# Patient Record
Sex: FEMALE | Race: WHITE | NOT HISPANIC OR LATINO | URBAN - METROPOLITAN AREA
[De-identification: names, ages, dates, MRNs, and addresses within clinical notes are randomized per-mention and may not be internally consistent; named-entity substitution may affect disease eponyms.]

---

## 2020-07-25 ENCOUNTER — TELEPHONE ENCOUNTER (OUTPATIENT)
Dept: URBAN - METROPOLITAN AREA CLINIC 13 | Facility: CLINIC | Age: 70
End: 2020-07-25

## 2020-07-25 RX ORDER — CALCIUM CITRATE/VITAMIN D3 315MG-6.25
TAKE 3 TABLET TWICE DAILY. PATIENT STATES UNKNOWN DOSAGE TABLET ORAL
Refills: 0 | OUTPATIENT
End: 2017-09-15

## 2020-07-25 RX ORDER — ESTRADIOL 10 UG/1
INSERT 1 SUPP WEEKLY INSERT VAGINAL
Refills: 0 | OUTPATIENT
Start: 2012-10-26 | End: 2017-09-15

## 2020-07-25 RX ORDER — MIRABEGRON 25 MG/1
TAKE 1 TABLET DAILY TABLET, FILM COATED, EXTENDED RELEASE ORAL
Refills: 0 | OUTPATIENT
End: 2018-09-21

## 2020-07-25 RX ORDER — ESTRADIOL/NORETHINDRONE ACETATE 1; .5 MG/1; MG/1
TAKE 1 TABLET EVERY OTHER DAY TABLET, FILM COATED ORAL
Refills: 0 | OUTPATIENT
End: 2017-09-15

## 2020-07-25 RX ORDER — SOLIFENACIN SUCCINATE 10 MG/1
TABLET, FILM COATED ORAL
Qty: 90 | Refills: 0 | OUTPATIENT
Start: 2012-05-25 | End: 2013-04-02

## 2020-07-26 ENCOUNTER — TELEPHONE ENCOUNTER (OUTPATIENT)
Dept: URBAN - METROPOLITAN AREA CLINIC 13 | Facility: CLINIC | Age: 70
End: 2020-07-26

## 2020-07-26 RX ORDER — GUAIFEN/P-PROPANOLAMIN/CODEINE
TAKE 1 TABLET BY MOUTH AT BEDTIME AS NEEDED EXPECTORANT ORAL
Qty: 30 | Refills: 0 | Status: ACTIVE | COMMUNITY
Start: 2012-07-05

## 2020-07-26 RX ORDER — KETOROLAC TROMETHAMINE 10 MG/1
TABLET, FILM COATED ORAL
Qty: 15 | Refills: 0 | Status: ACTIVE | COMMUNITY
Start: 2013-08-13

## 2020-07-26 RX ORDER — MIRABEGRON 50 MG/1
TABLET, FILM COATED, EXTENDED RELEASE ORAL
Qty: 90 | Refills: 0 | Status: ACTIVE | COMMUNITY
Start: 2013-03-07

## 2020-07-26 RX ORDER — MULTIVITAMIN
TAKE 1 TABLET DAILY TABLET ORAL
Refills: 0 | Status: ACTIVE | COMMUNITY

## 2020-07-26 RX ORDER — LIFITEGRAST 50 MG/ML
INSTILL 1 DROP IN EACH EYE TWICE DAILY APPROXIMATELY 12 HOURS APART SOLUTION/ DROPS OPHTHALMIC
Qty: 30 | Refills: 0 | Status: ACTIVE | COMMUNITY
Start: 2018-07-03

## 2020-07-26 RX ORDER — GUAIFEN/P-PROPANOLAMIN/CODEINE
EXPECTORANT ORAL
Qty: 30 | Refills: 0 | Status: ACTIVE | COMMUNITY
Start: 2012-09-10

## 2020-07-26 RX ORDER — DIPHENOXYLATE HYDROCHLORIDE AND ATROPINE SULFATE 2.5; .025 MG/1; MG/1
TABLET ORAL
Qty: 20 | Refills: 0 | Status: ACTIVE | COMMUNITY
Start: 2012-08-21

## 2020-07-26 RX ORDER — HYDROCORTISONE ACETATE AND PRAMOXINE HYDROCHLORIDE 10; 10 MG/G; MG/G
APPLY TO AFFECTED AREA TWICE A DAY CREAM TOPICAL
Qty: 28 | Refills: 0 | Status: ACTIVE | COMMUNITY
Start: 2012-08-31

## 2020-07-26 RX ORDER — RIFAXIMIN 200 MG/1
TAKE 1 TABLET BY MOUTH 3 TIMES A DAY FOR 10 DAYS TABLET ORAL
Qty: 30 | Refills: 0 | Status: ACTIVE | COMMUNITY
Start: 2012-09-12

## 2020-07-26 RX ORDER — IVERMECTIN 10 MG/G
APPLY TO FACE ONCE DAILY CREAM TOPICAL
Qty: 45 | Refills: 0 | Status: ACTIVE | COMMUNITY
Start: 2018-05-17

## 2020-07-26 RX ORDER — MIRABEGRON 50 MG/1
TABLET, FILM COATED, EXTENDED RELEASE ORAL
Qty: 90 | Refills: 0 | Status: ACTIVE | COMMUNITY
Start: 2013-05-09

## 2020-07-26 RX ORDER — AZITHROMYCIN DIHYDRATE 250 MG/1
TABLET, FILM COATED ORAL
Qty: 6 | Refills: 0 | Status: ACTIVE | COMMUNITY
Start: 2012-08-20

## 2020-07-26 RX ORDER — OXYBUTYNIN CHLORIDE 15 MG/1
TAKE 1 TABLET (15 MG) BY ORAL ROUTE ONCE DAILY FOR 30 DAYS TABLET, EXTENDED RELEASE ORAL
Qty: 30 | Refills: 0 | Status: ACTIVE | COMMUNITY
Start: 2018-04-02

## 2020-07-26 RX ORDER — ESTRADIOL AND NORETHINDRONE ACETATE 1; .5 MG/1; MG/1
TABLET, FILM COATED ORAL
Qty: 1 | Refills: 0 | Status: ACTIVE | COMMUNITY
Start: 2012-04-11

## 2020-07-26 RX ORDER — ALENDRONATE SODIUM 70 MG
TAKE 1 TABLET ONCE WEEKLY TABLET ORAL
Refills: 0 | Status: ACTIVE | COMMUNITY

## 2020-07-26 RX ORDER — PREDNISONE 10 MG/1
TABLET ORAL
Qty: 21 | Refills: 0 | Status: ACTIVE | COMMUNITY
Start: 2013-03-13

## 2020-07-26 RX ORDER — HYDROCORTISONE ACETATE AND PRAMOXINE HYDROCHLORIDE 10; 10 MG/G; MG/G
CREAM TOPICAL
Qty: 28 | Refills: 0 | Status: ACTIVE | COMMUNITY
Start: 2012-09-04

## 2020-07-26 RX ORDER — RIFAXIMIN 200 MG/1
TABLET ORAL
Qty: 30 | Refills: 0 | Status: ACTIVE | COMMUNITY
Start: 2012-09-13

## 2020-07-26 RX ORDER — OXYBUTYNIN CHLORIDE 5 MG/1
TAKE 1 TABLET EVERY OTHER DAY TABLET ORAL
Refills: 0 | Status: ACTIVE | COMMUNITY

## 2020-07-26 RX ORDER — OXYMETAZOLINE HYDROCHLORIDE 1 G/100G
APPLY TO FACE ONCE DAILY IN IN THE MORNING WHEN NEEDED FOR REDNESS FLA CREAM TOPICAL
Qty: 30 | Refills: 0 | Status: ACTIVE | COMMUNITY
Start: 2018-05-17

## 2020-07-26 RX ORDER — GUAIFEN/P-PROPANOLAMIN/CODEINE
EXPECTORANT ORAL
Qty: 30 | Refills: 0 | Status: ACTIVE | COMMUNITY
Start: 2012-04-12

## 2020-07-26 RX ORDER — ZOLPIDEM TARTRATE 6.25 MG/1
TABLET, EXTENDED RELEASE ORAL
Qty: 30 | Refills: 0 | Status: ACTIVE | COMMUNITY
Start: 2013-05-30

## 2020-07-26 RX ORDER — GUAIFEN/P-PROPANOLAMIN/CODEINE
EXPECTORANT ORAL
Qty: 30 | Refills: 0 | Status: ACTIVE | COMMUNITY
Start: 2012-12-05

## 2020-07-26 RX ORDER — OXYCODONE AND ACETAMINOPHEN 5; 325 MG/1; MG/1
TABLET ORAL
Qty: 40 | Refills: 0 | Status: ACTIVE | COMMUNITY
Start: 2013-08-13

## 2021-05-24 ENCOUNTER — TELEPHONE ENCOUNTER (OUTPATIENT)
Dept: URBAN - METROPOLITAN AREA CLINIC 113 | Facility: CLINIC | Age: 71
End: 2021-05-24

## 2021-05-24 ENCOUNTER — LAB OUTSIDE AN ENCOUNTER (OUTPATIENT)
Dept: URBAN - METROPOLITAN AREA CLINIC 113 | Facility: CLINIC | Age: 71
End: 2021-05-24

## 2021-05-25 ENCOUNTER — TELEPHONE ENCOUNTER (OUTPATIENT)
Dept: URBAN - METROPOLITAN AREA CLINIC 113 | Facility: CLINIC | Age: 71
End: 2021-05-25

## 2022-06-07 ENCOUNTER — TELEPHONE ENCOUNTER (OUTPATIENT)
Dept: URBAN - METROPOLITAN AREA CLINIC 113 | Facility: CLINIC | Age: 72
End: 2022-06-07

## 2022-06-08 ENCOUNTER — LAB OUTSIDE AN ENCOUNTER (OUTPATIENT)
Dept: URBAN - METROPOLITAN AREA CLINIC 113 | Facility: CLINIC | Age: 72
End: 2022-06-08

## 2023-04-24 ENCOUNTER — TELEPHONE ENCOUNTER (OUTPATIENT)
Dept: URBAN - METROPOLITAN AREA CLINIC 113 | Facility: CLINIC | Age: 73
End: 2023-04-24

## 2023-07-05 ENCOUNTER — OFFICE VISIT (OUTPATIENT)
Dept: URBAN - METROPOLITAN AREA SURGERY CENTER 25 | Facility: SURGERY CENTER | Age: 73
End: 2023-07-05

## 2023-07-28 ENCOUNTER — OUT OF OFFICE VISIT (OUTPATIENT)
Dept: URBAN - METROPOLITAN AREA SURGERY CENTER 25 | Facility: SURGERY CENTER | Age: 73
End: 2023-07-28
Payer: MEDICARE

## 2023-07-28 ENCOUNTER — WEB ENCOUNTER (OUTPATIENT)
Dept: URBAN - METROPOLITAN AREA SURGERY CENTER 25 | Facility: SURGERY CENTER | Age: 73
End: 2023-07-28

## 2023-07-28 DIAGNOSIS — K64.0 FIRST DEGREE HEMORRHOIDS: ICD-10-CM

## 2023-07-28 DIAGNOSIS — Z12.11 COLON CANCER SCREENING (HIGH RISK): ICD-10-CM

## 2023-07-28 DIAGNOSIS — K57.30 COLON, DIVERTICULOSIS: ICD-10-CM

## 2023-07-28 DIAGNOSIS — Z12.11 COLON CANCER SCREENING: ICD-10-CM

## 2023-07-28 PROCEDURE — G0121 COLON CA SCRN NOT HI RSK IND: HCPCS | Performed by: INTERNAL MEDICINE

## 2023-07-28 PROCEDURE — 00811 ANES LWR INTST NDSC NOS: CPT | Performed by: ANESTHESIOLOGY

## 2023-07-28 PROCEDURE — G8907 PT DOC NO EVENTS ON DISCHARG: HCPCS | Performed by: INTERNAL MEDICINE

## 2023-07-28 PROCEDURE — 00811 ANES LWR INTST NDSC NOS: CPT | Performed by: NURSE ANESTHETIST, CERTIFIED REGISTERED

## 2024-06-07 ENCOUNTER — TELEPHONE ENCOUNTER (OUTPATIENT)
Dept: URBAN - METROPOLITAN AREA CLINIC 113 | Facility: CLINIC | Age: 74
End: 2024-06-07

## 2024-09-26 ENCOUNTER — TELEPHONE ENCOUNTER (OUTPATIENT)
Dept: URBAN - METROPOLITAN AREA CLINIC 113 | Facility: CLINIC | Age: 74
End: 2024-09-26

## 2025-06-23 ENCOUNTER — TELEPHONE ENCOUNTER (OUTPATIENT)
Dept: URBAN - METROPOLITAN AREA CLINIC 23 | Facility: CLINIC | Age: 75
End: 2025-06-23

## 2025-06-24 ENCOUNTER — DASHBOARD ENCOUNTERS (OUTPATIENT)
Age: 75
End: 2025-06-24

## 2025-06-24 ENCOUNTER — OFFICE VISIT (OUTPATIENT)
Dept: URBAN - METROPOLITAN AREA CLINIC 113 | Facility: CLINIC | Age: 75
End: 2025-06-24
Payer: MEDICARE

## 2025-06-24 DIAGNOSIS — K86.2 PANCREATIC CYST: ICD-10-CM

## 2025-06-24 DIAGNOSIS — R14.3 EXCESSIVE GAS: ICD-10-CM

## 2025-06-24 DIAGNOSIS — R14.0 ABDOMINAL BLOATING: ICD-10-CM

## 2025-06-24 PROBLEM — 80301007: Status: ACTIVE | Noted: 2025-06-24

## 2025-06-24 PROBLEM — 116289008: Status: ACTIVE | Noted: 2025-06-24

## 2025-06-24 PROCEDURE — 99214 OFFICE O/P EST MOD 30 MIN: CPT | Performed by: NURSE PRACTITIONER

## 2025-06-24 RX ORDER — RIFAXIMIN 200 MG/1
TAKE 1 TABLET BY MOUTH 3 TIMES A DAY FOR 10 DAYS TABLET ORAL
Qty: 30 | Refills: 0 | Status: ON HOLD | COMMUNITY
Start: 2012-09-12

## 2025-06-24 RX ORDER — AZITHROMYCIN DIHYDRATE 250 MG/1
TABLET, FILM COATED ORAL
Qty: 6 | Refills: 0 | Status: ON HOLD | COMMUNITY
Start: 2012-08-20

## 2025-06-24 RX ORDER — GUAIFEN/P-PROPANOLAMIN/CODEINE
EXPECTORANT ORAL
Qty: 30 | Refills: 0 | Status: ON HOLD | COMMUNITY
Start: 2012-04-12

## 2025-06-24 RX ORDER — PREDNISONE 10 MG/1
TABLET ORAL
Qty: 21 | Refills: 0 | Status: ON HOLD | COMMUNITY
Start: 2013-03-13

## 2025-06-24 RX ORDER — METRONIDAZOLE 500 MG/1
1 TABLET TABLET ORAL THREE TIMES A DAY
Qty: 30 | OUTPATIENT
Start: 2025-06-24 | End: 2025-07-04

## 2025-06-24 RX ORDER — MULTIVITAMIN
TAKE 1 TABLET DAILY TABLET ORAL
Refills: 0 | Status: ON HOLD | COMMUNITY

## 2025-06-24 RX ORDER — MIRABEGRON 50 MG/1
TABLET, FILM COATED, EXTENDED RELEASE ORAL
Qty: 90 | Refills: 0 | Status: ON HOLD | COMMUNITY
Start: 2013-03-07

## 2025-06-24 RX ORDER — ESTRADIOL AND NORETHINDRONE ACETATE 1; .5 MG/1; MG/1
TABLET, FILM COATED ORAL
Qty: 1 | Refills: 0 | Status: ON HOLD | COMMUNITY
Start: 2012-04-11

## 2025-06-24 RX ORDER — IVERMECTIN 10 MG/G
1 APPLICATION CREAM TOPICAL ONCE A DAY
Status: ACTIVE | COMMUNITY
Start: 2025-06-24

## 2025-06-24 RX ORDER — OXYBUTYNIN CHLORIDE 5 MG/1
TAKE 1 TABLET EVERY OTHER DAY TABLET ORAL
Refills: 0 | Status: ON HOLD | COMMUNITY

## 2025-06-24 RX ORDER — OXYCODONE AND ACETAMINOPHEN 5; 325 MG/1; MG/1
TABLET ORAL
Qty: 40 | Refills: 0 | Status: ON HOLD | COMMUNITY
Start: 2013-08-13

## 2025-06-24 RX ORDER — IVERMECTIN 10 MG/G
APPLY TO FACE ONCE DAILY CREAM TOPICAL
Qty: 45 | Refills: 0 | Status: ON HOLD | COMMUNITY
Start: 2018-05-17

## 2025-06-24 RX ORDER — OXYMETAZOLINE HYDROCHLORIDE 1 G/100G
APPLY TO FACE ONCE DAILY IN IN THE MORNING WHEN NEEDED FOR REDNESS FLA CREAM TOPICAL
Qty: 30 | Refills: 0 | Status: ON HOLD | COMMUNITY
Start: 2018-05-17

## 2025-06-24 RX ORDER — OXYBUTYNIN CHLORIDE 15 MG/1
TAKE 1 TABLET (15 MG) BY ORAL ROUTE ONCE DAILY FOR 30 DAYS TABLET, EXTENDED RELEASE ORAL
Qty: 30 | Refills: 0 | Status: ON HOLD | COMMUNITY
Start: 2018-04-02

## 2025-06-24 RX ORDER — LIFITEGRAST 50 MG/ML
INSTILL 1 DROP IN EACH EYE TWICE DAILY APPROXIMATELY 12 HOURS APART SOLUTION/ DROPS OPHTHALMIC
Qty: 30 | Refills: 0 | Status: ON HOLD | COMMUNITY
Start: 2018-07-03

## 2025-06-24 RX ORDER — ZOLPIDEM TARTRATE 6.25 MG/1
TABLET, EXTENDED RELEASE ORAL
Qty: 30 | Refills: 0 | Status: ON HOLD | COMMUNITY
Start: 2013-05-30

## 2025-06-24 RX ORDER — DIPHENOXYLATE HYDROCHLORIDE AND ATROPINE SULFATE 2.5; .025 MG/1; MG/1
TABLET ORAL
Qty: 20 | Refills: 0 | Status: ON HOLD | COMMUNITY
Start: 2012-08-21

## 2025-06-24 RX ORDER — ALENDRONATE SODIUM 70 MG
TAKE 1 TABLET ONCE WEEKLY TABLET ORAL
Refills: 0 | Status: ON HOLD | COMMUNITY

## 2025-06-24 RX ORDER — HYDROCORTISONE ACETATE AND PRAMOXINE HYDROCHLORIDE 10; 10 MG/G; MG/G
APPLY TO AFFECTED AREA TWICE A DAY CREAM TOPICAL
Qty: 28 | Refills: 0 | Status: ON HOLD | COMMUNITY
Start: 2012-08-31

## 2025-06-24 RX ORDER — KETOROLAC TROMETHAMINE 10 MG/1
TABLET, FILM COATED ORAL
Qty: 15 | Refills: 0 | Status: ON HOLD | COMMUNITY
Start: 2013-08-13

## 2025-06-24 NOTE — HPI-TODAY'S VISIT:
This is a 74-year-old female with a history of hepatic and pancreatic cyst (IPMN), colon diverticulosis, and up-to-date colon cancer screening due in 2028 presenting for follow-up. She was last seen in 2023 for a screening colonoscopy notable for diverticulosis.  Prep was excellent.  Screening recommended in 2028. Labs 2/27/2025:CBC: WBC 5.37, hemoglobin 12.2, MCV 96.6, platelet 240.  1/28/2025 BMP normal with exception of glucose 64.  LFTs: TB 1.2, ALP 39, ALT 29, AST 34. 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  Repeat MRI recommended in 1 to 2 years. She denies changes in her medical or surgical history.  She has been experiencing excessive gas and bloating.  She has regular bowel movements numbering 1 or 2/day.  She has mild, infrequent, brief waves of nausea.  She denies other abdominal symptoms.

## 2025-06-24 NOTE — HPI-OTHER HISTORIES
Labs 2/27/2025:CBC: WBC 5.37, hemoglobin 12.2, MCV 96.6, platelet 240. 1/28/2025 BMP normal with exception of glucose 64. LFTs: TB 1.2, ALP 39, ALT 29, AST 34.  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 Repeat MRI recommended in 1 to 2 years.